# Patient Record
Sex: FEMALE
[De-identification: names, ages, dates, MRNs, and addresses within clinical notes are randomized per-mention and may not be internally consistent; named-entity substitution may affect disease eponyms.]

---

## 2018-08-30 ENCOUNTER — RECORDS - HEALTHEAST (OUTPATIENT)
Dept: ADMINISTRATIVE | Facility: OTHER | Age: 78
End: 2018-08-30

## 2018-09-17 ENCOUNTER — COMMUNICATION - HEALTHEAST (OUTPATIENT)
Dept: TELEHEALTH | Facility: CLINIC | Age: 78
End: 2018-09-17

## 2018-09-17 ENCOUNTER — HOSPITAL ENCOUNTER (OUTPATIENT)
Dept: CARDIOLOGY | Facility: HOSPITAL | Age: 78
Discharge: HOME | End: 2018-09-17

## 2018-09-17 DIAGNOSIS — I51.7 CARDIOMEGALY: ICD-10-CM

## 2018-09-17 DIAGNOSIS — R05.9 COUGH: ICD-10-CM

## 2018-09-17 ASSESSMENT — MIFFLIN-ST. JEOR: SCORE: 1391.68

## 2018-09-18 LAB
AORTIC VALVE MEAN VELOCITY: 109 CM/S
AV DIMENSIONLESS INDEX VTI: 0.6
AV MEAN GRADIENT: 5 MMHG
AV PEAK GRADIENT: 9.9 MMHG
AV VALVE AREA: 1.9 CM2
AV VELOCITY RATIO: 0.7
BSA FOR ECHO PROCEDURE: 2.06 M2
CV BLOOD PRESSURE: NORMAL MMHG
CV ECHO HEIGHT: 63 IN
CV ECHO WEIGHT: 210 LBS
DOP CALC AO PEAK VEL: 157 CM/S
DOP CALC AO VTI: 37.4 CM
DOP CALC LVOT AREA: 3.14 CM2
DOP CALC LVOT DIAMETER: 2 CM
DOP CALC LVOT PEAK VEL: 103 CM/S
DOP CALC LVOT STROKE VOLUME: 70.7 CM3
DOP CALCLVOT PEAK VEL VTI: 22.5 CM
EJECTION FRACTION: 58 % (ref 55–75)
FRACTIONAL SHORTENING: 31.6 % (ref 28–44)
INTERVENTRICULAR SEPTUM IN END DIASTOLE: 0.82 CM (ref 0.6–0.9)
IVS/PW RATIO: 1
LA AREA 1: 16.2 CM2
LA AREA 2: 17.2 CM2
LEFT ATRIUM LENGTH: 5.2 CM
LEFT ATRIUM SIZE: 4.5 CM
LEFT ATRIUM VOLUME INDEX: 22.1 ML/M2
LEFT ATRIUM VOLUME: 45.5 ML
LEFT VENTRICLE DIASTOLIC VOLUME INDEX: 54.4 CM3/M2 (ref 34–74)
LEFT VENTRICLE DIASTOLIC VOLUME: 112 CM3 (ref 46–106)
LEFT VENTRICLE MASS INDEX: 64.3 G/M2
LEFT VENTRICLE SYSTOLIC VOLUME INDEX: 23 CM3/M2 (ref 11–31)
LEFT VENTRICLE SYSTOLIC VOLUME: 47.4 CM3 (ref 14–42)
LEFT VENTRICULAR INTERNAL DIMENSION IN DIASTOLE: 4.9 CM (ref 3.8–5.2)
LEFT VENTRICULAR INTERNAL DIMENSION IN SYSTOLE: 3.35 CM (ref 2.2–3.5)
LEFT VENTRICULAR MASS: 132.5 G
LEFT VENTRICULAR OUTFLOW TRACT MEAN GRADIENT: 2 MMHG
LEFT VENTRICULAR OUTFLOW TRACT MEAN VELOCITY: 72.8 CM/S
LEFT VENTRICULAR OUTFLOW TRACT PEAK GRADIENT: 4 MMHG
LEFT VENTRICULAR POSTERIOR WALL IN END DIASTOLE: 0.8 CM (ref 0.6–0.9)
LV STROKE VOLUME INDEX: 34.3 ML/M2
MITRAL VALVE E/A RATIO: 0.7
MV AVERAGE E/E' RATIO: 16.8 CM/S
MV DECELERATION TIME: 331 MS
MV E'TISSUE VEL-LAT: 4.84 CM/S
MV E'TISSUE VEL-MED: 3.96 CM/S
MV LATERAL E/E' RATIO: 15.2
MV MEDIAL E/E' RATIO: 18.6
MV PEAK A VELOCITY: 107 CM/S
MV PEAK E VELOCITY: 73.7 CM/S
NUC REST DIASTOLIC VOLUME INDEX: 3360 LBS
NUC REST SYSTOLIC VOLUME INDEX: 63 IN
TRICUSPID VALVE ANULAR PLANE SYSTOLIC EXCURSION: 1.7 CM

## 2021-03-26 ENCOUNTER — TELEPHONE (OUTPATIENT)
Dept: OPHTHALMOLOGY | Facility: CLINIC | Age: 81
End: 2021-03-26

## 2021-03-27 NOTE — TELEPHONE ENCOUNTER
Contacted by outside ED provider concern for angle closure glaucoma. Patient presented with eye pain and decrease in vision, IOP 65, she was started on IV diamox 500 and intraocular pressure lowering drops, her IOP decreased to 40's. Outside ED provider working on patient transfer to the [facility] ED or direct admission for ophthalmology evaluation.

## 2021-05-26 ENCOUNTER — RECORDS - HEALTHEAST (OUTPATIENT)
Dept: ADMINISTRATIVE | Facility: CLINIC | Age: 81
End: 2021-05-26

## 2021-05-27 ENCOUNTER — RECORDS - HEALTHEAST (OUTPATIENT)
Dept: ADMINISTRATIVE | Facility: CLINIC | Age: 81
End: 2021-05-27

## 2021-05-29 ENCOUNTER — RECORDS - HEALTHEAST (OUTPATIENT)
Dept: ADMINISTRATIVE | Facility: CLINIC | Age: 81
End: 2021-05-29

## 2021-06-01 ENCOUNTER — RECORDS - HEALTHEAST (OUTPATIENT)
Dept: ADMINISTRATIVE | Facility: CLINIC | Age: 81
End: 2021-06-01

## 2021-06-02 VITALS — BODY MASS INDEX: 37.21 KG/M2 | WEIGHT: 210 LBS | HEIGHT: 63 IN

## 2021-06-03 ENCOUNTER — RECORDS - HEALTHEAST (OUTPATIENT)
Dept: ADMINISTRATIVE | Facility: CLINIC | Age: 81
End: 2021-06-03

## 2021-06-16 PROBLEM — J18.0 BRONCHOPNEUMONIA: Status: ACTIVE | Noted: 2018-03-22

## 2021-07-13 ENCOUNTER — RECORDS - HEALTHEAST (OUTPATIENT)
Dept: ADMINISTRATIVE | Facility: CLINIC | Age: 81
End: 2021-07-13

## 2021-07-21 ENCOUNTER — RECORDS - HEALTHEAST (OUTPATIENT)
Dept: ADMINISTRATIVE | Facility: CLINIC | Age: 81
End: 2021-07-21